# Patient Record
Sex: MALE | Race: WHITE | Employment: FULL TIME | URBAN - METROPOLITAN AREA
[De-identification: names, ages, dates, MRNs, and addresses within clinical notes are randomized per-mention and may not be internally consistent; named-entity substitution may affect disease eponyms.]

---

## 2021-04-29 ENCOUNTER — HOSPITAL ENCOUNTER (EMERGENCY)
Age: 31
Discharge: HOME | End: 2021-04-29
Attending: EMERGENCY MEDICINE

## 2021-04-29 VITALS
TEMPERATURE: 97.8 F | RESPIRATION RATE: 16 BRPM | HEIGHT: 72 IN | WEIGHT: 270 LBS | HEART RATE: 84 BPM | OXYGEN SATURATION: 98 % | DIASTOLIC BLOOD PRESSURE: 113 MMHG | BODY MASS INDEX: 36.57 KG/M2 | SYSTOLIC BLOOD PRESSURE: 147 MMHG

## 2021-04-29 DIAGNOSIS — H16.8 KERATITIS SECONDARY TO CONTACT LENS: Primary | ICD-10-CM

## 2021-04-29 DIAGNOSIS — H18.829 KERATITIS SECONDARY TO CONTACT LENS: Primary | ICD-10-CM

## 2021-04-29 PROCEDURE — 99285 EMERGENCY DEPT VISIT HI MDM: CPT

## 2021-04-29 PROCEDURE — 6370000000 HC RX 637 (ALT 250 FOR IP): Performed by: PHYSICIAN ASSISTANT

## 2021-04-29 RX ORDER — CARBOXYMETHYLCELLULOSE SODIUM 2.5 MG/ML
1 SOLUTION/ DROPS OPHTHALMIC
Qty: 1.2 ML | Refills: 0 | Status: SHIPPED | OUTPATIENT
Start: 2021-04-29 | End: 2021-04-30

## 2021-04-29 RX ORDER — MOXIFLOXACIN 5 MG/ML
1 SOLUTION/ DROPS OPHTHALMIC 4 TIMES DAILY
Qty: 1 BOTTLE | Refills: 0 | Status: SHIPPED | OUTPATIENT
Start: 2021-04-29 | End: 2021-05-06

## 2021-04-29 RX ORDER — IBUPROFEN 800 MG/1
800 TABLET ORAL EVERY 8 HOURS PRN
Qty: 20 TABLET | Refills: 0 | Status: SHIPPED | OUTPATIENT
Start: 2021-04-29

## 2021-04-29 RX ORDER — TETRACAINE HYDROCHLORIDE 5 MG/ML
1 SOLUTION OPHTHALMIC ONCE
Status: COMPLETED | OUTPATIENT
Start: 2021-04-29 | End: 2021-04-29

## 2021-04-29 RX ADMIN — TETRACAINE HYDROCHLORIDE 1 DROP: 5 SOLUTION OPHTHALMIC at 13:26

## 2021-04-29 RX ADMIN — FLUORESCEIN SODIUM 1 EACH: 0.6 STRIP OPHTHALMIC at 13:26

## 2021-04-29 ASSESSMENT — VISUAL ACUITY: OU: 1

## 2021-04-29 NOTE — ED PROVIDER NOTES
[address] ED  EMERGENCY DEPARTMENT ENCOUNTER        Pt Name: Evaristo Hyde  MRN: [id number]  [place] 1990  Date of evaluation: 4/29/2021  Provider: JUDI Oden  PCP: No primary care provider on file. This patient was seen and evaluated by the attending physician Ryley Stevens       Chief Complaint   Patient presents with    Eye Pain     rt eye feels like something is in eye and sensitive to light. HISTORY OF PRESENT ILLNESS   (Location/Symptom, Timing/Onset, Context/Setting, Quality, Duration, Modifying Factors, Severity)  Note limiting factors. Evaristo Hyde is a 35 y.o. male who presents via private vehicle from his home for evaluation of right eye pain. Patient notes that he started having what felt like foreign body sensation to the right eye yesterday. Patient wears contact lenses. He wears daily lenses. He notes that sometimes he will sleep in them but not frequently. He has not recently slept in them. He was wearing contact lenses yesterday when he went to work. He notes that he is a  and he is unsure if he got may be a piece of metal in his eye. He has looked at his eye he does not see any foreign body. He notes his last tetanus was within the last year. He denies any drainage from the eye. He denies any significant change in vision, mild blurred vision with taking the contact out he has had it out since yesterday and has not put it back in. He denies any fevers. No pain with moving the eyeball. No headaches no chills. He does not have an eye doctor that he sees on a regular basis. Nursing Notes were all reviewed and agreed with or any disagreements were addressed  in the HPI. Pt was seen during the [place] 19 pandemic. Appropriate PPE worn by ME during patient encounters.  Pt seen during a time with constrained hospital bed capacity and other potential inpatient and outpatient resources were constrained due to the viral pandemic. REVIEW OF SYSTEMS    (2-9 systems for level 4, 10 or more for level 5)     Review of Systems    Positives and Pertinent negatives as per HPI. Except as noted abovein the ROS, all other systems were reviewed and negative. PAST MEDICAL HISTORY   History reviewed. No pertinent past medical history. SURGICAL HISTORY     Past Surgical History:   Procedure Laterality Date    DENTAL SURGERY      TONSILLECTOMY      TYMPANOSTOMY TUBE PLACEMENT           [address]       Discharge Medication List as of 4/29/2021  2:21 PM      CONTINUE these medications which have NOT CHANGED    Details   citalopram (CELEXA) 20 MG tablet Take 1 tablet by mouth daily. , Disp-30 tablet, R-0               ALLERGIES     Patient has no known allergies.     FAMILYHISTORY       Family History   Problem Relation Age of Onset    Diabetes Mother     Diabetes Maternal Aunt     Diabetes Maternal Uncle     Diabetes Maternal Grandmother           SOCIAL HISTORY       Social History     Socioeconomic History    Marital status: Single     Spouse name: None    Number of children: None    Years of education: None    Highest education level: None   Occupational History    None   Social Needs    Financial resource strain: None    Food insecurity     Worry: None     Inability: None    Transportation needs     Medical: None     Non-medical: None   Tobacco Use    Smoking status: Current Every Day Smoker     Packs/day: 0.50     Years: 5.00     Pack years: 2.50     Types: Cigarettes   Substance and Sexual Activity    Alcohol use: Yes     Comment: monthly    Drug use: Yes     Types: Marijuana     Comment: every month or so    Sexual activity: Never   Lifestyle    Physical activity     Days per week: None     Minutes per session: None    Stress: None   Relationships    Social connections     Talks on phone: None     Gets together: None     Attends Restorationist service: None     Active member of club or organization: None     Attends meetings of clubs or organizations: None     Relationship status: None    Intimate partner violence     Fear of current or ex partner: None     Emotionally abused: None     Physically abused: None     Forced sexual activity: None   Other Topics Concern    None   Social History Narrative    None       SCREENINGS    [place] Coma Scale  Eye Opening: Spontaneous  Best Verbal Response: Oriented  Best Motor Response: Obeys commands  [place] Coma Scale Score: 15        PHYSICAL EXAM    (up to 7 for level 4, 8 or more for level 5)     ED Triage Vitals [04/29/21 1248]   BP Temp Temp Source Pulse Resp SpO2 Height Weight   (!) 145/96 97.8 °F (36.6 °C) Oral 81 18 98 % 6' (1.829 m) 270 lb (122.5 kg)       Physical Exam  Vitals signs and nursing note reviewed. Constitutional:       General: He is awake. He is not in acute distress. Appearance: Normal appearance. He is well-developed. He is not ill-appearing, toxic-appearing or diaphoretic. HENT:      Head: Normocephalic and atraumatic. Right Ear: External ear normal.      Left Ear: External ear normal.      Nose: Nose normal.      Mouth/Throat:      Mouth: Mucous membranes are moist.   Eyes:      General: Lids are normal. Lids are everted, no foreign bodies appreciated. Vision grossly intact. Gaze aligned appropriately. No allergic shiner, visual field deficit or scleral icterus. Right eye: No foreign body or discharge. Left eye: No discharge. Extraocular Movements: Extraocular movements intact. Conjunctiva/sclera:      Right eye: Right conjunctiva is injected. No chemosis, exudate or hemorrhage. Pupils: Pupils are equal, round, and reactive to light. Right eye: Pupil is round, reactive and not sluggish. No corneal abrasion or fluorescein uptake. Reuben exam negative. Slit lamp exam:     Right eye: Anterior chamber quiet. Photophobia present.    Neck:      Musculoskeletal: Normal range of motion and neck supple. Cardiovascular:      Rate and Rhythm: Normal rate and regular rhythm. Heart sounds: Normal heart sounds. No murmur. No friction rub. No gallop. Pulmonary:      Effort: Pulmonary effort is normal. No respiratory distress. Breath sounds: Normal breath sounds. No wheezing or rales. Skin:     General: Skin is warm and dry. Capillary Refill: Capillary refill takes less than 2 seconds. Findings: No rash. Neurological:      General: No focal deficit present. Mental Status: He is alert, oriented to person, place, and time and easily aroused. Psychiatric:         Behavior: Behavior normal. Behavior is cooperative. DIAGNOSTIC RESULTS   LABS:    Labs Reviewed - No data to display    All other labs were within normal range or not returned as of this dictation. EKG: All EKG's are interpreted by the Emergency Department Physician who either signs orCo-signs this chart in the absence of a cardiologist.  Please see their note for interpretation of EKG. RADIOLOGY:   Non-plain film images such as CT, Ultrasound and MRI are read by the radiologist. Plain radiographic images are visualized andpreliminarily interpreted by the  ED Provider with the below findings:        Interpretation perthe Radiologist below, if available at the time of this note:    No orders to display     No results found.        PROCEDURES   Unless otherwise noted below, none     Procedures    CRITICAL CARE TIME   N/A    CONSULTS:  IP CONSULT TO OPHTHALMOLOGY      EMERGENCY DEPARTMENT COURSE and DIFFERENTIALDIAGNOSIS/MDM:   Vitals:    Vitals:    04/29/21 1248 04/29/21 1415   BP: (!) 145/96 (!) 147/113   Pulse: 81 84   Resp: 18 16   Temp: 97.8 °F (36.6 °C)    TempSrc: Oral    SpO2: 98% 98%   Weight: 270 lb (122.5 kg)    Height: 6' (1.829 m)        Patient was given thefollowing medications:  Medications   fluorescein ophthalmic strip 1 each (1 each Ophthalmic Given 4/29/21 1326)   tetracaine injury or retinal detachment. We have discussed the symptoms which are most concerning (e.g., changing or worsening pain, vision changes, neck stiffness or fever) that necessitate immediate return. Pt is in agreement with the current plan and all questions were addressed.          Discharge Time out:  CC Reviewed Yes   Test Results Yes     Vitals:    04/29/21 1415   BP: (!) 147/113   Pulse: 84   Resp: 16   Temp:    SpO2: 98%              FINAL IMPRESSION      1. Keratitis secondary to contact lens          DISPOSITION/PLAN   DISPOSITION Decision To Discharge 04/29/2021 02:17:19 PM      PATIENT REFERREDTO:  Stephanie Cole [address]  [address] [place] [address]  [phone]    Go in 1 day  at 5 AM TOMORROW, [facility]:  Discharge Medication List as of 4/29/2021  2:21 PM      START taking these medications    Details   moxifloxacin (VIGAMOX) 0.5 % ophthalmic solution Place 1 drop into the right eye 4 times daily for 7 days, Disp-1 Bottle, R-0Print      Carboxymethylcellulose Sodium (THERATEARS) 0.25 % SOLN Apply 1 drop to eye every hour for 1 day, Disp-1.2 mL, R-0Print      ibuprofen (IBU) 800 MG tablet Take 1 tablet by mouth every 8 hours as needed for Pain, Disp-20 tablet, R-0Print             DISCONTINUED MEDICATIONS:  Discharge Medication List as of 4/29/2021  2:21 PM                 (Please note that portions ofthis note were completed with a voice recognition program.  Efforts were made to edit the dictations but occasionally words are mis-transcribed.)    JUDI Cespedes (electronically signed)        Aleksander Cespedes  04/29/21 [email or web address]

## 2021-04-29 NOTE — ED NOTES
Visual acuity  bilat 20/25  Left eye 20/20 with contact in   Right eye 20/70 no contact in      Aramis Richmond, PRANAV  04/29/21 4969

## 2021-04-29 NOTE — ED TRIAGE NOTES
Pt right eye sensitive to light and feels like something is in it. 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

## 2021-09-13 NOTE — ED PROVIDER NOTES
Patient Education     Viral Upper Respiratory Illness (Adult)    You have a viral upper respiratory illness (URI), which is another term for the common cold. This illness is contagious during the first few days. It is spread through the air by coughing and sneezing. It may also be spread by direct contact (touching the sick person and then touching your own eyes, nose, or mouth). Frequent handwashing will decrease risk of spread. Most viral illnesses go away within 7 to 10 days with rest and simple home remedies. Sometimes the illness may last for several weeks. Antibiotics will not kill a virus, and they are generally not prescribed for this condition.  Home care  · If symptoms are severe, rest at home for the first 2 to 3 days. When you resume activity, don't let yourself get too tired.  · Don't smoke. If you need help stopping, talk with your healthcare provider.  · Avoid being exposed to cigarette smoke (yours or others’).  · You may use acetaminophen or ibuprofen to control pain and fever, unless another medicine was prescribed. If you have chronic liver or kidney disease, have ever had a stomach ulcer or gastrointestinal bleeding, or are taking blood-thinning medicines, talk with your healthcare provider before using these medicines. Aspirin should never be given to anyone under 18 years of age who is ill with a viral infection or fever. It may cause severe liver or brain damage.  · Your appetite may be poor, so a light diet is fine. Stay well hydrated by drinking 6 to 8 glasses of fluids per day (water, soft drinks, juices, tea, or soup). Extra fluids will help loosen secretions in the nose and lungs.  · Over-the-counter cold medicines will not shorten the length of time you’re sick, but they may be helpful for the following symptoms: cough, sore throat, and nasal and sinus congestion. If you take prescription medicines, ask your healthcare provider or pharmacist which over-the-counter medicines are safe to  I independently interviewed, examined and evaluated Fredy Mims. In brief, this is a 64-year-old with irritation and foreign body sensation to the right eye. 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 He states he has a foreign body sensation, wears contacts but he was wearing contacts yesterday does not have any today. He denies vision change. He describes clear tearing from the eye. On exam, he does have mild conjunctival injection. No chemosis. Eye pupil is briskly reactive. On eyelid retraction, he does not have foreign body. The patient did not allow me to perform a fluorescein exam as this is already been done by JOHN and he states that he did not want it repeated. ED course: The patient presents with eye irritation and foreign body sensation. Vital signs are within normal limits. External appearance of the eye shows no abnormalities except for some conjunctival injection. Fluorescein exam was concerning for developing corneal ulcer. This was diagnosed by JOHN, we spoke with ophthalmology and they gave the recommendations. The patient was 20/70 in his right eye, however he was not wearing his glasses and did not bring it with him. He scheduled for an outpatient appointment with ophthalmology in the morning. He is told to return for any worsening vision changes and he voices understanding. All diagnostic, treatment, and disposition decisions were made by myself in conjunction with the advanced practice provider. For all further details of the patient's emergency department visit, please see the advanced practice provider's documentation. Comment: Please note this report has been produced using speech recognition software and may contain errors related to that system including errors in grammar, punctuation, and spelling, as well as words and phrases that may be inappropriate.  If there are any questions or concerns please feel free to contact the dictating provider for clarification.          Emmanuel, [address] [place] [address]  05/01/21 1643 use. (Note: Don't use decongestants if you have high blood pressure.)  Follow-up care  Follow up with your healthcare provider, or as advised.  When to seek medical advice  Call your healthcare provider right away if any of these occur:  · Cough with lots of colored sputum (mucus)  · Severe headache; face, neck, or ear pain  · Difficulty swallowing due to throat pain  · Fever of 100.4°F (38°C) or higher, or as directed by your healthcare provider  Call 911  Call 911 if any of these occur:  · Chest pain, shortness of breath, wheezing, or difficulty breathing  · Coughing up blood  · Very severe pain with swallowing, especially if it goes along with a muffled voice   [employer] last reviewed this educational content on 6/1/2018  © 0035-6807 [employer]. All rights reserved. This information is not intended as a substitute for professional medical care. Always follow your healthcare professional's instructions.    Return to Work or release from isolation:  Per the most recent CDC recommendations you may return to work/activity and stop quarantine if ALL of the following conditions are met:    1. At least 24 hours have passed since recovery which is defined as resolution of fever without the use of fever-reducing medications       AND    2. 72 hours of  improvement in symptoms                                                          AND  3.  At least 10 days have passed since symptoms first appeared.